# Patient Record
Sex: FEMALE | Race: BLACK OR AFRICAN AMERICAN | NOT HISPANIC OR LATINO | Employment: FULL TIME
[De-identification: names, ages, dates, MRNs, and addresses within clinical notes are randomized per-mention and may not be internally consistent; named-entity substitution may affect disease eponyms.]

---

## 2017-01-03 ENCOUNTER — SURGERY (OUTPATIENT)
Age: 42
End: 2017-01-03

## 2017-01-05 ENCOUNTER — TELEPHONE (OUTPATIENT)
Dept: GASTROENTEROLOGY | Facility: CLINIC | Age: 42
End: 2017-01-05

## 2017-01-05 NOTE — TELEPHONE ENCOUNTER
Manometry Results:    Jackhammer esophagus.     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    Pls arrange follow up apt with Dr. Bravo on 1/19 at 9am to discuss further management.

## 2017-01-19 ENCOUNTER — LAB VISIT (OUTPATIENT)
Dept: LAB | Facility: HOSPITAL | Age: 42
End: 2017-01-19
Attending: INTERNAL MEDICINE
Payer: COMMERCIAL

## 2017-01-19 ENCOUNTER — TELEPHONE (OUTPATIENT)
Dept: ENDOSCOPY | Facility: HOSPITAL | Age: 42
End: 2017-01-19

## 2017-01-19 ENCOUNTER — OFFICE VISIT (OUTPATIENT)
Dept: GASTROENTEROLOGY | Facility: CLINIC | Age: 42
End: 2017-01-19
Payer: COMMERCIAL

## 2017-01-19 VITALS
SYSTOLIC BLOOD PRESSURE: 117 MMHG | HEIGHT: 64 IN | HEART RATE: 86 BPM | WEIGHT: 197.56 LBS | DIASTOLIC BLOOD PRESSURE: 74 MMHG | BODY MASS INDEX: 33.73 KG/M2

## 2017-01-19 DIAGNOSIS — D64.9 ANEMIA, UNSPECIFIED TYPE: ICD-10-CM

## 2017-01-19 DIAGNOSIS — R13.10 DYSPHAGIA, UNSPECIFIED TYPE: Primary | ICD-10-CM

## 2017-01-19 DIAGNOSIS — R79.89 ABNORMAL LFTS: ICD-10-CM

## 2017-01-19 DIAGNOSIS — R14.0 BLOATING: ICD-10-CM

## 2017-01-19 DIAGNOSIS — K21.9 GASTROESOPHAGEAL REFLUX DISEASE, ESOPHAGITIS PRESENCE NOT SPECIFIED: ICD-10-CM

## 2017-01-19 LAB
ALBUMIN SERPL BCP-MCNC: 3.7 G/DL
ALP SERPL-CCNC: 81 U/L
ALT SERPL W/O P-5'-P-CCNC: 9 U/L
ANION GAP SERPL CALC-SCNC: 7 MMOL/L
AST SERPL-CCNC: 19 U/L
BASOPHILS # BLD AUTO: 0.01 K/UL
BASOPHILS NFR BLD: 0.2 %
BILIRUB DIRECT SERPL-MCNC: 0.3 MG/DL
BILIRUB SERPL-MCNC: 0.9 MG/DL
BUN SERPL-MCNC: 15 MG/DL
CALCIUM SERPL-MCNC: 9.5 MG/DL
CHLORIDE SERPL-SCNC: 107 MMOL/L
CO2 SERPL-SCNC: 28 MMOL/L
CREAT SERPL-MCNC: 0.9 MG/DL
DIFFERENTIAL METHOD: ABNORMAL
EOSINOPHIL # BLD AUTO: 0.1 K/UL
EOSINOPHIL NFR BLD: 1.7 %
ERYTHROCYTE [DISTWIDTH] IN BLOOD BY AUTOMATED COUNT: 13.5 %
EST. GFR  (AFRICAN AMERICAN): >60 ML/MIN/1.73 M^2
EST. GFR  (NON AFRICAN AMERICAN): >60 ML/MIN/1.73 M^2
FERRITIN SERPL-MCNC: 23 NG/ML
GLUCOSE SERPL-MCNC: 85 MG/DL
HBV SURFACE AG SERPL QL IA: NEGATIVE
HCT VFR BLD AUTO: 36.3 %
HCV AB SERPL QL IA: NEGATIVE
HEPATITIS A ANTIBODY, IGG: POSITIVE
HGB BLD-MCNC: 12.2 G/DL
IRON SERPL-MCNC: 61 UG/DL
LYMPHOCYTES # BLD AUTO: 1.7 K/UL
LYMPHOCYTES NFR BLD: 35.7 %
MCH RBC QN AUTO: 30.9 PG
MCHC RBC AUTO-ENTMCNC: 33.6 %
MCV RBC AUTO: 92 FL
MONOCYTES # BLD AUTO: 0.2 K/UL
MONOCYTES NFR BLD: 3.6 %
NEUTROPHILS # BLD AUTO: 2.8 K/UL
NEUTROPHILS NFR BLD: 58.6 %
PLATELET # BLD AUTO: 236 K/UL
PMV BLD AUTO: 11.3 FL
POTASSIUM SERPL-SCNC: 4.4 MMOL/L
PROT SERPL-MCNC: 7.6 G/DL
RBC # BLD AUTO: 3.95 M/UL
SATURATED IRON: 16 %
SODIUM SERPL-SCNC: 142 MMOL/L
TOTAL IRON BINDING CAPACITY: 392 UG/DL
TRANSFERRIN SERPL-MCNC: 265 MG/DL
WBC # BLD AUTO: 4.73 K/UL

## 2017-01-19 PROCEDURE — 86803 HEPATITIS C AB TEST: CPT

## 2017-01-19 PROCEDURE — 87340 HEPATITIS B SURFACE AG IA: CPT

## 2017-01-19 PROCEDURE — 36415 COLL VENOUS BLD VENIPUNCTURE: CPT

## 2017-01-19 PROCEDURE — 80053 COMPREHEN METABOLIC PANEL: CPT

## 2017-01-19 PROCEDURE — 99214 OFFICE O/P EST MOD 30 MIN: CPT | Mod: S$GLB,,, | Performed by: INTERNAL MEDICINE

## 2017-01-19 PROCEDURE — 82728 ASSAY OF FERRITIN: CPT

## 2017-01-19 PROCEDURE — 83540 ASSAY OF IRON: CPT

## 2017-01-19 PROCEDURE — 86790 VIRUS ANTIBODY NOS: CPT

## 2017-01-19 PROCEDURE — 99999 PR PBB SHADOW E&M-EST. PATIENT-LVL III: CPT | Mod: PBBFAC,,, | Performed by: INTERNAL MEDICINE

## 2017-01-19 PROCEDURE — 85025 COMPLETE CBC W/AUTO DIFF WBC: CPT

## 2017-01-19 PROCEDURE — 82248 BILIRUBIN DIRECT: CPT

## 2017-01-19 PROCEDURE — 86706 HEP B SURFACE ANTIBODY: CPT

## 2017-01-19 RX ORDER — PANTOPRAZOLE SODIUM 40 MG/1
40 TABLET, DELAYED RELEASE ORAL 2 TIMES DAILY
Qty: 180 TABLET | Refills: 3 | Status: SHIPPED | OUTPATIENT
Start: 2017-01-19 | End: 2019-02-04

## 2017-01-19 NOTE — PROGRESS NOTES
[facility] Established Patient Note        Reason for Consult:    Chief Complaint   Patient presents with    Dysphagia    Heartburn    Gastroesophageal Reflux       No flowsheet data found.    PCP:   Primary Doctor No       Referring MD:  No referring provider defined for this encounter.    HPI:  Daniella Cui is a 41 y.o. female previously followed by Dr. Dolan, here for evaluation of the following GI problems:    Dysphagia.  Patient reports difficulty swallowing solids and liquids.  Feels that food moves very slowly down esophagus.  Symptoms occur few times per day.  Also has chest pain and discomfort when swallowing few times per week.  Symptoms started about a year ago. Denies food impactions requiring ED visit.  Reports history of seasonal allergies.  No food allergies.  Denies eczema.    Gas and bloating. Patient reports bothersome gas and bloating with abdominal distension.  Symptoms get worse after meals and as the day progresses.  Consumes milk products and artificial sugars.  Symptoms started about a year or two ago.      GERD.  Has occasional heartburn and regurgitation of acidic liquid.     Early satiety.  Feels full after eating a small meal.  Occasional nausea, no emesis.     Anemia.  Denies gi bleeding. Denies heavy menses. HAs fatigue, feels cold all the time.     Elevated TB. No know liver disease.      Denies dysphagia, abdominal pain, diarrhea, constipation, BRBPR, melena, weight loss.       Previous Studies:   EGD 9/28/16: Normal esophagus. Chronic gastritis. Biopsied -. Normal examined duodenum.  US 10/5/16: No significant sonographic abnormality  Manometry 1/3/17: Hypercontractile (Jackhammer) Esophagus.    ROS:  ROS   Constitutional: No fevers, no chills, no night sweats, no weight loss  ENT: No congestion, no rhinorrhea, no chronic sinus problems  CV: No chest pain, no palpitations  Pulm: No cough, no shortness of  "breath  Ophtho: No blurry vision, no eye redness  GI: see HPI  Derm: No rash  Heme: No lymphadenopathy, no bruising  MSK: No arthritis, no joint swelling, no Raynauds  : No dysuria, no frequent urination, no blood in urine  Endo: No hot or cold intolerance  Neuro: No dizziness, no syncope, no seizure  Psych: No anxiety, no depression    Medical History:   Past Medical History   Diagnosis Date    GERD (gastroesophageal reflux disease)         Surgical History: History reviewed. No pertinent past surgical history.     Family History:   Family History   Problem Relation Age of Onset    Diabetes Father     Hypertension Father     Celiac disease Neg Hx     Colon cancer Neg Hx     Colon polyps Neg Hx     Cystic fibrosis Neg Hx     Esophageal cancer Neg Hx     Hemochromatosis Neg Hx     Inflammatory bowel disease Neg Hx     Liver disease Neg Hx     Rectal cancer Neg Hx     Stomach cancer Neg Hx     Nathan's disease Neg Hx         Social History:   Social History     Social History    Marital status: Significant Other     Spouse name: N/A    Number of children: N/A    Years of education: N/A     Social History Main Topics    Smoking status: Never Smoker    Smokeless tobacco: None    Alcohol use No    Drug use: No    Sexual activity: Not Asked     Other Topics Concern    None     Social History Narrative    None        Review of patient's allergies indicates:  No Known Allergies    Current Outpatient Prescriptions   Medication Sig Dispense Refill    pantoprazole (PROTONIX) 40 MG tablet Take 1 tablet (40 mg total) by mouth 2 (two) times daily. 180 tablet 3     No current facility-administered medications for this visit.         Objective Findings:  Vital Signs:  Visit Vitals    /74    Pulse 86    Ht 5' 4" (1.626 m)    Wt 89.6 kg (197 lb 8.5 oz)    LMP 12/29/2016    BMI 33.91 kg/m2     Body mass index is 33.91 kg/(m^2).    Physical Exam:  General appearance: alert, cooperative, no " distress  HENT: Normocephalic, atraumatic, neck symmetrical, no nasal discharge  Eyes: conjunctivae/corneas clear, PERRL, EOM's intact  Lungs: clear to auscultation bilaterally, no dullness to percussion bilaterally  Heart: regular rate and rhythm without rub; no displacement of the PMI  Abdomen: soft, non-tender; bowel sounds normoactive; no organomegaly  Extremities: extremities symmetric; no clubbing, cyanosis, or edema  Integument: Skin color, texture, turgor normal; no rashes; hair distrubution normal  Neurologic: Alert and oriented X 3, normal strength, normal coordination and gait  Psychiatric: no pressured speech; normal affect; no evidence of impaired cognition    Labs:  Lab Results   Component Value Date    WBC 5.91 09/14/2016    HGB 11.5 (L) 09/14/2016    HCT 33.9 (L) 09/14/2016    MCV 93 09/14/2016     09/14/2016     Lab Results   Component Value Date     09/14/2016    K 4.1 09/14/2016     09/14/2016    CO2 31 (H) 09/14/2016    GLU 86 09/14/2016    BUN 12 09/14/2016    CREATININE 0.9 09/14/2016    CALCIUM 9.5 09/14/2016    PROT 7.2 09/14/2016    ALBUMIN 4.0 09/14/2016    BILITOT 1.2 (H) 09/14/2016    ALKPHOS 70 09/14/2016    AST 17 09/14/2016    ALT 9 (L) 09/14/2016         Assessment and Plan:  Daniella Cui is a 41 y.o. female with:  Dysphagia.  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  -Protonix 40mg BID   -EGD w EoE bx on PPI x 8 weeks  -Will try CCB vs TCA.  Would consider Botox if no improvement     Gas and bloating.   -Eliminate lactose and artificial sugars for 2 weeks    GERD. Early satiety     -PPI BID, will decrease after EGD     Anemia.   -iron studies    Elevated TB. US negative  -Check hep panel, CMP, DB      Return in about 3 months (around 4/19/2017).    1. Dysphagia, unspecified type    2. Bloating    3. Gastroesophageal reflux disease, esophagitis presence not specified    4. Abnormal LFTs    5. Anemia, unspecified type          Order summary:  Orders Placed This  Encounter    CBC auto differential    Comprehensive metabolic panel    Bilirubin, direct    Hepatitis C antibody    Hepatitis B Surface Antibody, Qual/Quant    Hepatitis B surface antigen    Hepatitis A antibody, IgG    Ferritin    Iron and TIBC    pantoprazole (PROTONIX) 40 MG tablet         Thank you so much for allowing me to participate in the care of Daniella Bravo MD

## 2017-01-19 NOTE — MR AVS SNAPSHOT
[facility]  [address]  [place] [address]  Phone: [phone]  Fax: [phone]                  Daniella Cui   2017 9:00 AM   Office Visit    Description:  Female : 1975   Provider:  Rosy Bravo MD   Department:  [facility]           Reason for Visit     Dysphagia     Heartburn     Gastroesophageal Reflux           Diagnoses this Visit        Comments    Dysphagia, unspecified type    -  Primary     Bloating         Gastroesophageal reflux disease, esophagitis presence not specified         Abnormal LFTs         Anemia, unspecified type                To Do List           Future Appointments        Provider Department Dept Phone    2017 10:20 AM LAB, APPOINTMENT [place] [facility] [phone]      Goals (5 Years of Data)     None       These Medications        Disp Refills Start End    pantoprazole (PROTONIX) 40 MG tablet 180 tablet 3 2017    Take 1 tablet (40 mg total) by mouth 2 (two) times daily. - Oral    Pharmacy: [id number] - [place] [address] [facility] [address] Ph #: [phone]         [facility]     [facility] -  Assistance  Registered nurses in the [facility] provide clinical advisement, health education, appointment booking, and other advisory services.  Call for this free service at [phone].             Medications           Message regarding Medications     Verify the changes and/or additions to your medication regime listed below are the same as discussed with your clinician today.  If any of these changes or additions are incorrect, please notify your healthcare provider.        START taking these NEW medications        Refills    pantoprazole (PROTONIX) 40 MG tablet 3    Sig: Take 1 tablet (40 mg total) by mouth 2 (two) times daily.    Class: Normal    Route: Oral          "  Verify that the below list of medications is an accurate representation of the medications you are currently taking.  If none reported, the list may be blank. If incorrect, please contact your healthcare provider. Carry this list with you in case of emergency.           Current Medications     pantoprazole (PROTONIX) 40 MG tablet Take 1 tablet (40 mg total) by mouth 2 (two) times daily.           Clinical Reference Information           Vital Signs - Last Recorded  Most recent update: 1/19/2017  9:19 AM by Darcy Pena MA    BP Pulse Ht Wt LMP BMI    117/74 86 5' 4" (1.626 m) 89.6 kg (197 lb 8.5 oz) 12/29/2016 33.91 kg/m2      Blood Pressure          Most Recent Value    BP  117/74      Allergies as of 1/19/2017     No Known Allergies      Immunizations Administered on Date of Encounter - 1/19/2017     None      Orders Placed During Today's Visit     Future Labs/Procedures Expected by Expires    Bilirubin, direct  1/19/2017 3/20/2018    CBC auto differential  1/19/2017 3/20/2018    Comprehensive metabolic panel  1/19/2017 3/20/2018    Ferritin  1/19/2017 3/20/2018    Hepatitis A antibody, IgG  1/19/2017 3/20/2018    Hepatitis B Surface Antibody, Qual/Quant  1/19/2017 3/20/2018    Hepatitis B surface antigen  1/19/2017 3/20/2018    Hepatitis C antibody  1/19/2017 3/20/2018    Iron and TIBC  1/19/2017 3/20/2018      "

## 2017-01-20 LAB
HEP. B SURF AB, QUAL: POSITIVE
HEP. B SURF AB, QUANT.: NORMAL MIU/ML

## 2017-03-15 ENCOUNTER — PATIENT MESSAGE (OUTPATIENT)
Dept: ENDOSCOPY | Facility: HOSPITAL | Age: 42
End: 2017-03-15

## 2017-03-16 ENCOUNTER — TELEPHONE (OUTPATIENT)
Dept: ENDOSCOPY | Facility: HOSPITAL | Age: 42
End: 2017-03-16

## 2017-11-14 ENCOUNTER — OFFICE VISIT (OUTPATIENT)
Dept: URGENT CARE | Facility: CLINIC | Age: 42
End: 2017-11-14
Payer: MEDICAID

## 2017-11-14 VITALS
HEART RATE: 74 BPM | HEIGHT: 64 IN | RESPIRATION RATE: 19 BRPM | DIASTOLIC BLOOD PRESSURE: 83 MMHG | OXYGEN SATURATION: 100 % | TEMPERATURE: 99 F | BODY MASS INDEX: 34.15 KG/M2 | WEIGHT: 200 LBS | SYSTOLIC BLOOD PRESSURE: 118 MMHG

## 2017-11-14 DIAGNOSIS — R10.9 ABDOMINAL PAIN, UNSPECIFIED ABDOMINAL LOCATION: Primary | ICD-10-CM

## 2017-11-14 LAB
B-HCG UR QL: NEGATIVE
BILIRUB UR QL STRIP: NEGATIVE
CTP QC/QA: YES
GLUCOSE UR QL STRIP: NEGATIVE
KETONES UR QL STRIP: NEGATIVE
LEUKOCYTE ESTERASE UR QL STRIP: NEGATIVE
PH, POC UA: 6
POC BLOOD, URINE: NEGATIVE
POC NITRATES, URINE: NEGATIVE
PROT UR QL STRIP: NEGATIVE
SP GR UR STRIP: 1.01 (ref 1–1.03)
UROBILINOGEN UR STRIP-ACNC: NORMAL (ref 0.1–1.1)

## 2017-11-14 PROCEDURE — 81025 URINE PREGNANCY TEST: CPT | Mod: S$GLB,,, | Performed by: FAMILY MEDICINE

## 2017-11-14 PROCEDURE — 99213 OFFICE O/P EST LOW 20 MIN: CPT | Mod: 25,S$GLB,, | Performed by: FAMILY MEDICINE

## 2017-11-14 PROCEDURE — 81003 URINALYSIS AUTO W/O SCOPE: CPT | Mod: QW,S$GLB,, | Performed by: FAMILY MEDICINE

## 2017-11-14 NOTE — PATIENT INSTRUCTIONS
Unknown Causes of Abdominal Pain (Female)    The exact cause of your abdominal (stomach) pain is not clear. This does not mean that this is something to worry about. Everyone likes to know the exact cause of the problem, but sometimes with abdominal pain, there is no clear-cut cause, and this could be a good thing. The good news is that your symptoms can be treated, and you will feel better.   Your condition does not seem serious now; however, sometimes the signs of a serious problem may take more time to appear. For this reason, it is important for you to watch for any new symptoms, problems, or worsening of your condition.  Over the next few days, the abdominal pain may come and go, or be continuous. Other common symptoms can include nausea and vomiting. Sometimes it can be difficult to tell if you feel nauseous, you may just feel bad and not associate that feeling with nausea. Constipation, diarrhea, and a fever may go along with the pain.  The pain may continue even if treated correctly over the following days. Depending on how things go, sometimes the cause can become clear and may require further or different treatment. Additional evaluations, medications, or tests may also be needed.  Home care  Your healthcare provider may prescribe medicine for pain, symptoms, or an infection.  Follow the healthcare provider's instructions for taking these medicines.  General care  · Rest as much as you can until your next exam. No strenuous activities.  · Try to find positions that ease discomfort. A small pillow placed on the abdomen may help relieve pain.  · Something warm on your abdomen (such as a heating pad) may help, but be careful not to burn yourself.  Diet  · Do not force yourself to eat, especially if having cramps, vomiting, or diarrhea.  · Water is important so you do not get dehydrated. Soup may also be good. Sports drinks may also help, especially if they are not too acidic. Make sure you don't drink  sugary drinks as this can make things worse. Take liquids in small amounts. Do not guzzle them.  · Caffeine sometimes makes the pain and cramping worse.  · Avoid dairy products if you have vomiting or diarrhea.  · Don't eat large amounts at a time. Wait a few minutes between bites.  · Eat a diet low in fiber (called a low-residue diet). Foods allowed include refined breads, white rice, fruit and vegetable juices without pulp, tender meats. These foods will pass more easily through the intestine.  · Avoid whole-grain foods, whole fruits and vegetables, meats, seeds and nuts, fried or fatty foods, dairy, alcohol and spicy foods until your symptoms go away.  Follow-up care  Follow up with your healthcare provider, or as advised, if your pain does not begin to improve in the next 24 hours.  Call 911  Call 911 if any of these occur:  · Trouble breathing  · Confusion  · Fainting or loss of consciousness  · Rapid heart rate  · Seizure  When to seek medical advice  Call your healthcare provider right away if any of these occur:  · Pain gets worse or moves to the right lower abdomen  · New or worsening vomiting or diarrhea  · Swelling of the abdomen  · Unable to pass stool for more than 3 days  · Fever of 100.4ºF (38ºC) or higher, or as directed by your healthcare provider.  · Blood in vomit or bowel movements (dark red or black color)  · Jaundice (yellow color of eyes and skin)  · Weakness, dizziness  · Chest, arm, back, neck or jaw pain  · Unexpected vaginal bleeding or missed period  · Can't keep down liquids or water and are getting dehydrated  Date Last Reviewed: 12/30/2015  © 8412-9919 [employer]. [address] [place] [address], [place] [address]. All rights reserved. This information is not intended as a substitute for professional medical care. Always follow your healthcare professional's instructions.

## 2017-11-14 NOTE — PROGRESS NOTES
"Subjective:       Patient ID: Daniella Cui is a 42 y.o. female.    Vitals:  height is 5' 4" (1.626 m) and weight is 90.7 kg (200 lb). Her temperature is 99.4 °F (37.4 °C). Her blood pressure is 118/83 and her pulse is 74. Her respiration is 19 and oxygen saturation is 100%.     Chief Complaint: Abdominal Pain    Patient reports she has had this pain difffusely for the past month. No nausea, vomiting or other concerning symptoms. Patient denies discharge or unusual bleeding. No urinary symptoms      Abdominal Pain   This is a new problem. The current episode started 1 to 4 weeks ago. The onset quality is gradual. The problem occurs intermittently. The problem has been gradually worsening. The pain is located in the epigastric region. The pain is at a severity of 8/10. The pain is severe. The quality of the pain is aching and a sensation of fullness. The abdominal pain does not radiate. Associated symptoms include frequency and nausea. Pertinent negatives include no constipation, diarrhea, dysuria, fever, hematochezia, melena or vomiting. The pain is aggravated by eating. The pain is relieved by nothing. She has tried nothing for the symptoms. The treatment provided no relief.     Review of Systems   Constitution: Negative for chills and fever.   Cardiovascular: Negative for chest pain.   Respiratory: Negative for shortness of breath.    Musculoskeletal: Positive for back pain.   Gastrointestinal: Positive for abdominal pain and nausea. Negative for constipation, diarrhea, hematochezia, melena and vomiting.   Genitourinary: Positive for frequency. Negative for dysuria.       Objective:      Physical Exam   Constitutional: She appears well-developed and well-nourished.   HENT:   Head: Normocephalic and atraumatic.   Eyes: EOM are normal. Pupils are equal, round, and reactive to light.   Neck: Normal range of motion. Neck supple.   Cardiovascular: Normal rate, regular rhythm and normal heart sounds.  "   Pulmonary/Chest: Effort normal and breath sounds normal.   Abdominal: She exhibits no distension and no mass. There is tenderness (mild diffuse). There is no guarding.   Nursing note and vitals reviewed.      Assessment:       1. Abdominal pain, unspecified abdominal location        Plan:         Abdominal pain, unspecified abdominal location  -     POCT Urinalysis, Dipstick, Automated, W/O Scope  -     POCT urine pregnancy      Non acute abdomen on exam and historical. Advised to followup with [facility] as planned. To go to the ER for worsening symptoms

## 2017-11-14 NOTE — PROGRESS NOTES
"Subjective:       Patient ID: Daniella Cui is a 42 y.o. female.    Vitals:  height is 5' 4" (1.626 m) and weight is 90.7 kg (200 lb). Her temperature is 99.4 °F (37.4 °C). Her blood pressure is 118/83 and her pulse is 74. Her respiration is 19 and oxygen saturation is 100%.     Chief Complaint: Abdominal Pain    HPI  ROS    Objective:      Physical Exam    Assessment:       No diagnosis found.    Plan:         There are no diagnoses linked to this encounter.  ***    "

## 2017-11-22 ENCOUNTER — TELEPHONE (OUTPATIENT)
Dept: URGENT CARE | Facility: CLINIC | Age: 42
End: 2017-11-22

## 2018-09-26 ENCOUNTER — TELEPHONE (OUTPATIENT)
Dept: GASTROENTEROLOGY | Facility: CLINIC | Age: 43
End: 2018-09-26

## 2018-10-16 ENCOUNTER — TELEPHONE (OUTPATIENT)
Dept: GASTROENTEROLOGY | Facility: CLINIC | Age: 43
End: 2018-10-16

## 2018-10-16 NOTE — TELEPHONE ENCOUNTER
Pt wanted to know if any cancellations or earlier appts are available to see  sooner. I told pt not as of yet but that I would keep her updated if so.

## 2018-10-16 NOTE — TELEPHONE ENCOUNTER
----- Message from Юлия Matias sent at 10/16/2018  9:01 AM CDT -----  Contact: self [phone]  Pt called stating she received a phone call from Ashlee, but I didn't see anything charted.    Contact: self [phone]

## 2019-02-04 ENCOUNTER — OFFICE VISIT (OUTPATIENT)
Dept: GASTROENTEROLOGY | Facility: CLINIC | Age: 44
End: 2019-02-04
Payer: MEDICAID

## 2019-02-04 ENCOUNTER — TELEPHONE (OUTPATIENT)
Dept: SPEECH THERAPY | Facility: HOSPITAL | Age: 44
End: 2019-02-04

## 2019-02-04 ENCOUNTER — TELEPHONE (OUTPATIENT)
Dept: GASTROENTEROLOGY | Facility: CLINIC | Age: 44
End: 2019-02-04

## 2019-02-04 ENCOUNTER — TELEPHONE (OUTPATIENT)
Dept: ENDOSCOPY | Facility: HOSPITAL | Age: 44
End: 2019-02-04

## 2019-02-04 VITALS
SYSTOLIC BLOOD PRESSURE: 121 MMHG | DIASTOLIC BLOOD PRESSURE: 80 MMHG | BODY MASS INDEX: 34.18 KG/M2 | WEIGHT: 200.19 LBS | HEART RATE: 70 BPM | HEIGHT: 64 IN

## 2019-02-04 DIAGNOSIS — R07.89 NON-CARDIAC CHEST PAIN: ICD-10-CM

## 2019-02-04 DIAGNOSIS — K21.9 GASTROESOPHAGEAL REFLUX DISEASE, ESOPHAGITIS PRESENCE NOT SPECIFIED: ICD-10-CM

## 2019-02-04 DIAGNOSIS — R68.81 EARLY SATIETY: ICD-10-CM

## 2019-02-04 DIAGNOSIS — R13.10 DYSPHAGIA, UNSPECIFIED TYPE: Primary | ICD-10-CM

## 2019-02-04 DIAGNOSIS — R14.0 BLOATING: ICD-10-CM

## 2019-02-04 DIAGNOSIS — D64.9 ANEMIA, UNSPECIFIED TYPE: ICD-10-CM

## 2019-02-04 PROCEDURE — 99215 PR OFFICE/OUTPT VISIT, EST, LEVL V, 40-54 MIN: ICD-10-PCS | Mod: S$PBB,,, | Performed by: INTERNAL MEDICINE

## 2019-02-04 PROCEDURE — 99999 PR PBB SHADOW E&M-EST. PATIENT-LVL III: CPT | Mod: PBBFAC,,, | Performed by: INTERNAL MEDICINE

## 2019-02-04 PROCEDURE — 99213 OFFICE O/P EST LOW 20 MIN: CPT | Mod: PBBFAC | Performed by: INTERNAL MEDICINE

## 2019-02-04 PROCEDURE — 99215 OFFICE O/P EST HI 40 MIN: CPT | Mod: S$PBB,,, | Performed by: INTERNAL MEDICINE

## 2019-02-04 PROCEDURE — 99999 PR PBB SHADOW E&M-EST. PATIENT-LVL III: ICD-10-PCS | Mod: PBBFAC,,, | Performed by: INTERNAL MEDICINE

## 2019-02-04 RX ORDER — DILTIAZEM HYDROCHLORIDE 60 MG/1
60 TABLET, FILM COATED ORAL 3 TIMES DAILY PRN
Qty: 90 TABLET | Refills: 3 | Status: SHIPPED | OUTPATIENT
Start: 2019-02-04 | End: 2020-02-04

## 2019-02-04 RX ORDER — CALC/MAG/B COMPLEX/D3/HERB 61
30 TABLET ORAL DAILY
Qty: 60 CAPSULE | Refills: 11 | Status: SHIPPED | OUTPATIENT
Start: 2019-02-04 | End: 2020-02-04

## 2019-02-04 NOTE — PATIENT INSTRUCTIONS
Jackhammer esophagus:  -Avoid triggers: cold or hot foods, caffeine  -eat mostly soft foods and liquids as they trigger fewer symptoms  -Start prevacid 30mg daily- 30 minutes before breakfast   -Start diltiazem MR 60mg three times per day (Side Effects: hypotension, bradycardia, edema)  -Start using peppermint oil as needed for trouble swallowing (peppermint oil (4 drops in half glass of water three times per day, mints, tea).  -Try breathing technique during episode of trouble of swallowing or panic attacks   -Discuss your sleep problems with your primary care provider.  We will consider adding trazodone to you regimen.   -Try yoga and meditation

## 2019-02-04 NOTE — PROGRESS NOTES
[facility] Established Patient Note        Reason for Consult:    Chief Complaint   Patient presents with    Dysphagia    Gas    Bloated    Heartburn    Chest Pain    Anemia       No flowsheet data found.    PCP:   Dr. López ????    Referring MD:  Dr. Dolan    HPI:  Daniella Cui is a 43 y.o. female previously followed by Dr. Dolan, here for evaluation of the following GI problems:    GERD.  None    Dysphagia.  Patient reports difficulty swallowing solids and liquids.  Feels that food moves very slowly down esophagus.  Symptoms occur few times per day.  Also has chest pain and discomfort when swallowing few times per week.  Symptoms started about a year ago. Denies food impactions requiring ED visit.  Reports history of seasonal allergies.  No food allergies.  Denies eczema.    Dysphagia.  Reports difficulty swallowing.  Problems with solids: yes  Problems with liquids:yes  Choking while eating yes  Coughing wile eating: yes  Location: cervical esophagus  Onset of symptoms: few years ago  Frequency:  daily  Better w carbonated drinks    Chest pain. Reports occasional chest pain and spasms of esophagus while eating.   Triggers (cold fluids, caffeine, smoking, ETOH): unsure    Not taking any medication   Eating more liquids    Achalasia Eckardt Score  Dysphagia  (none (0), occasional (1), daily (2), with every meal (3)): 3   Regurgitation (none (0), occasional (1), daily (2), with every meal (3)): 3   Chest pain (none (0), occasional (1), daily (2), several times per day (3)): 1    Weight loss (kg) (0 (0), <5 (1), 5-10 (2), >10 (3)): 0  Total Eckardt Score(the final score is the sum of four components (0-12)):  7/12     Early satiety.  Still feels full after eating a small meal.  No nausea, no emesis.     Gas and bloating. Still w  bothersome gas and bloating with abdominal distension.  Avoids milk, consumes artificial sugars    Anemia.  Denies gi  bleeding. Denies heavy menses. HAs fatigue, feels cold all the time.     Elevated TB. No know liver disease.      Denies nausea, vomiting, abdominal pain, diarrhea, constipation, BRBPR, melena, weight loss.      Anxiety.  Has ho of anxiety and panic feelings.  Followed by PCP.  Working on natural ways of managing her anxiety.      Insomnia.  Having difficulty sleeping.  Feels that is chocking at night. Feels anxious at night.      Previous Studies:   EGD 9/28/16: Normal esophagus. Chronic gastritis. Biopsied -. Normal examined duodenum.  US 10/5/16: No significant sonographic abnormality  Manometry 1/3/17: Hypercontractile (Jackhammer) Esophagus.    ROS:  ROS   Constitutional: No fevers, no chills, no night sweats, no weight loss  ENT: No congestion, no rhinorrhea, no chronic sinus problems  CV: No chest pain, no palpitations  Pulm: No cough, no shortness of breath  Ophtho: No blurry vision, no eye redness  GI: see HPI  Derm: No rash  Heme: No lymphadenopathy, no bruising  MSK: No arthritis, no joint swelling, no Raynauds  : No dysuria, no frequent urination, no blood in urine  Endo: No hot or cold intolerance  Neuro: No dizziness, no syncope, no seizure  Psych: No anxiety, no depression    Medical History:   Past Medical History:   Diagnosis Date    GERD (gastroesophageal reflux disease)         Surgical History:   Past Surgical History:   Procedure Laterality Date    ESOPHAGOGASTRODUODENOSCOPY (EGD) N/A 9/28/2016    Performed by [facility] at [facility]    MANOMETRY-ESOPHAGEAL-WITH IMPEDANCE N/A 1/3/2017    Performed by Rosy Bravo MD at [facility] ENDO (4TH FLR)        Family History:   Family History   Problem Relation Age of Onset    Diabetes Father     Hypertension Father     Celiac disease Neg Hx     Colon cancer Neg Hx     Colon polyps Neg Hx     Cystic fibrosis Neg Hx     Esophageal cancer Neg Hx     Hemochromatosis Neg Hx     Inflammatory bowel disease Neg Hx     Liver disease Neg Hx   "   Rectal cancer Neg Hx     Stomach cancer Neg Hx     Nathan's disease Neg Hx         Social History:   Social History     Socioeconomic History    Marital status: Significant Other     Spouse name: None    Number of children: None    Years of education: None    Highest education level: None   Social Needs    Financial resource strain: None    Food insecurity - worry: None    Food insecurity - inability: None    Transportation needs - medical: None    Transportation needs - non-medical: None   Occupational History    None   Tobacco Use    Smoking status: Never Smoker   Substance and Sexual Activity    Alcohol use: No    Drug use: No    Sexual activity: None   Other Topics Concern    None   Social History Narrative    None        Review of patient's allergies indicates:  No Known Allergies    Current Outpatient Medications   Medication Sig Dispense Refill    diltiaZEM (CARDIZEM) 60 MG tablet Take 1 tablet (60 mg total) by mouth 3 (three) times daily as needed. 90 tablet 3    lansoprazole (PREVACID 24HR) 15 MG capsule Take 2 capsules (30 mg total) by mouth once daily. 60 capsule 11     No current facility-administered medications for this visit.         Objective Findings:  Vital Signs:  /80   Pulse 70   Ht 5' 4" (1.626 m)   Wt 90.8 kg (200 lb 2.8 oz)   BMI 34.36 kg/m²   Body mass index is 34.36 kg/m².    Physical Exam:  General appearance: alert, cooperative, no distress  HENT: Normocephalic, atraumatic, neck symmetrical, no nasal discharge  Eyes: conjunctivae/corneas clear, PERRL, EOM's intact  Lungs: clear to auscultation bilaterally, no dullness to percussion bilaterally  Heart: regular rate and rhythm without rub; no displacement of the PMI  Abdomen: soft, non-tender; bowel sounds normoactive; no organomegaly  Extremities: extremities symmetric; no clubbing, cyanosis, or edema  Integument: Skin color, texture, turgor normal; no rashes; hair distrubution normal  Neurologic: Alert and " oriented X 3, normal strength, normal coordination and gait  Psychiatric: no pressured speech; normal affect; no evidence of impaired cognition    Labs:  Lab Results   Component Value Date    WBC 4.73 01/19/2017    HGB 12.2 01/19/2017    HCT 36.3 (L) 01/19/2017    MCV 92 01/19/2017     01/19/2017     Lab Results   Component Value Date     01/19/2017    K 4.4 01/19/2017     01/19/2017    CO2 28 01/19/2017    GLU 85 01/19/2017    BUN 15 01/19/2017    CREATININE 0.9 01/19/2017    CALCIUM 9.5 01/19/2017    PROT 7.6 01/19/2017    ALBUMIN 3.7 01/19/2017    BILITOT 0.9 01/19/2017    ALKPHOS 81 01/19/2017    AST 19 01/19/2017    ALT 9 (L) 01/19/2017         Assessment and Plan:  Daniella Cui is a 43 y.o. female with:    GERD. Minimal   -Prevacid 30mg ER daily mostly for dysphagia     Dysphagia. Chocking and coughing while eating. Eckardt Score 7/12.  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  -EGD w EoE, G, D   -MBS and esophagogram  -Repeat manometry if EGD/esophagogram suggest achalasia.   -Avoid triggers: cold fluids, caffeine. Pt denies smoking, ETOH  -Soft foods and liquids as they trigger fewer symptoms   -Prevacid 30mg ER daily   -Diltiazem MR 60mg TID prn (B - 1 RCT)  -try peppermint in various forms prn   -Will consider trazodone at next visit  -Discussed pathophysiology, presentation, and treatment (including surgical options and POEM) of hypercontractile esophagus. Pt want to start with minimally invasive options at this time    -Will consider isosorbide dinitrate, sildenafil, EGD w Botox prior to surgery or POEM     Early satiety.  No nausea or emesis.      Gas and bloating.   -Eliminate lactose and artificial sugars for 2 weeks    Anemia. Low ferritin   -Check iron studies  -EGD     Elevated TB. US negative.  No hepatitis. Repeal CMP/Db nl.   -Check CMP    Anxiety. Panic attacks  -Discussed the benefit of diaphragmatic breathing in management of functional Gi disorders. Provided  handout.   -Discussed the role of mindfulness and meditation in management of functional GI disorders.  Provided handout  -Discussed the role of exercise in management of functional GI disorders.  PT will consider starting exercise program, yoga.      Insomnia.   -Fu w PCP   -Will add trazodone if no improvement w CCB     Follow-up in about 2 months (around 4/4/2019) for BEE Vargas NP.    1. Dysphagia, unspecified type    2. Anemia, unspecified type    3. Non-cardiac chest pain    4. Gastroesophageal reflux disease, esophagitis presence not specified    5. Early satiety    6. Bloating          Order summary:  Orders Placed This Encounter    Fl Modified Barium Swallow Speech    FL Esophagram Complete    CBC auto differential    Comprehensive metabolic panel    TSH    T4, free    Iron and TIBC    Ferritin    SLP video swallow    diltiaZEM (CARDIZEM) 60 MG tablet    lansoprazole (PREVACID 24HR) 15 MG capsule    Case request GI: ESOPHAGOGASTRODUODENOSCOPY (EGD)         Thank you so much for allowing me to participate in the care of Daniella Hammkaty Bravo MD

## 2019-02-05 ENCOUNTER — TELEPHONE (OUTPATIENT)
Dept: GASTROENTEROLOGY | Facility: CLINIC | Age: 44
End: 2019-02-05

## 2019-02-05 ENCOUNTER — PATIENT MESSAGE (OUTPATIENT)
Dept: GASTROENTEROLOGY | Facility: CLINIC | Age: 44
End: 2019-02-05

## 2019-02-05 NOTE — TELEPHONE ENCOUNTER
Called pt and left vm informing her that Dr. Bravo has ordered her some labs to be drawn and she can call back for scheduling.

## 2019-02-05 NOTE — TELEPHONE ENCOUNTER
----- Message from Rosy Bravo MD sent at 2/4/2019  4:14 PM CST -----  pls ask pt to gt labs done.  I added them to her note today

## 2019-02-06 ENCOUNTER — TELEPHONE (OUTPATIENT)
Dept: SPEECH THERAPY | Facility: HOSPITAL | Age: 44
End: 2019-02-06

## 2019-02-15 ENCOUNTER — TELEPHONE (OUTPATIENT)
Dept: SPEECH THERAPY | Facility: HOSPITAL | Age: 44
End: 2019-02-15

## 2019-02-19 ENCOUNTER — TELEPHONE (OUTPATIENT)
Dept: SPEECH THERAPY | Facility: HOSPITAL | Age: 44
End: 2019-02-19

## 2019-04-03 ENCOUNTER — TELEPHONE (OUTPATIENT)
Dept: RADIOLOGY | Facility: HOSPITAL | Age: 44
End: 2019-04-03

## 2019-04-04 ENCOUNTER — HOSPITAL ENCOUNTER (OUTPATIENT)
Dept: RADIOLOGY | Facility: HOSPITAL | Age: 44
Discharge: HOME | End: 2019-04-04
Attending: INTERNAL MEDICINE
Payer: MEDICAID

## 2019-04-04 ENCOUNTER — CLINICAL SUPPORT (OUTPATIENT)
Dept: SPEECH THERAPY | Facility: HOSPITAL | Age: 44
End: 2019-04-04
Attending: INTERNAL MEDICINE
Payer: MEDICAID

## 2019-04-04 DIAGNOSIS — R13.10 DYSPHAGIA, UNSPECIFIED TYPE: ICD-10-CM

## 2019-04-04 PROCEDURE — 74220 X-RAY XM ESOPHAGUS 1CNTRST: CPT | Mod: TC

## 2019-04-04 PROCEDURE — 74230 X-RAY XM SWLNG FUNCJ C+: CPT | Mod: TC

## 2019-04-04 PROCEDURE — 74230 FL MODIFIED BARIUM SWALLOW SPEECH STUDY: ICD-10-PCS | Mod: 26,,, | Performed by: RADIOLOGY

## 2019-04-04 PROCEDURE — 92611 MOTION FLUOROSCOPY/SWALLOW: CPT | Mod: GN | Performed by: SPEECH-LANGUAGE PATHOLOGIST

## 2019-04-04 PROCEDURE — 74230 X-RAY XM SWLNG FUNCJ C+: CPT | Mod: 26,,, | Performed by: RADIOLOGY

## 2019-04-04 NOTE — PROGRESS NOTES
Referring provider: Dr. Rosy Bravo  Reason for visit:  Modified barium swallow study (CPT 58375)    Report Summary   --Date: 04/04/2019  --Purpose: to evaluate anatomy and physiology of the oropharyngeal swallow, to determine effectiveness of rehabilitation strategies, and to determine diet consistency and intervention recommendations.   --Diet recommendations: regular as tolerated    Subjective / History    Daniella Cui is a 43 y.o. female referred by Dr. Bravo for Modified Barium Swallow Study (83623).  She is currently on a regular diet.  She reports a history of difficulty swallowing.  The problem occurs with food, liquid, and pills.  She has reduced meat intake as a result of this issue.  Reports the issue sometimes happens with water.  Reports pills are difficult to swallow and feel stuck.  Denies sensation of aspiration.  Denies weight loss or pneumonia.     Past Medical History:   Diagnosis Date    GERD (gastroesophageal reflux disease)      Current Outpatient Medications on File Prior to Visit   Medication Sig Dispense Refill    diltiaZEM (CARDIZEM) 60 MG tablet Take 1 tablet (60 mg total) by mouth 3 (three) times daily as needed. 90 tablet 3    lansoprazole (PREVACID 24HR) 15 MG capsule Take 2 capsules (30 mg total) by mouth once daily. 60 capsule 11     No current facility-administered medications on file prior to visit.        Objective   Lateral videofluorographic views were obtained. The radiologist and speech pathologist were present for the entire procedure.     Consistencies assessed / method of administration  Thin liquid/6 mL  Thin liquid/cup sip  Thin liquid/sequential cup sips  Applesauce/tsp  Cracker  Barium tablet w/ water    Oral phase  - Grossly WNL with adequate/timely lip closure, tongue control during bolus hold, bolus preparation, and bolus transport/lingual motion.  Little to no oral residue.     Pharyngeal phase  - Timely initiation; intermittently delayed to valleculae  with thin liquids  - Adequate soft palate elevation  - Adequate laryngeal elevation and anterior hyoid excursion  - Adequate tongue base retraction  - Adequate epiglottic movement  - Complete laryngeal vestibular closure  - Adequate pharyngeal stripping wave  - Minimal pharyngeal residue   - Unobstructed PE segment opening    Strategies/therapeutic interventions attempted: N/a.     Rosenbek Penetration-Aspiration Scale (Rosenbek et al 1996)  Best Trial: 1. Contrast does not enter airway.   Worst Trial: 1. Contrast does not enter airway.     Assessment / Impressions   The results of this MBSS indicate that patient demonstrates oropharyngeal swallowing function appropriate for a full PO diet w/o significant restriction.  No aspiration observed.     Recommendations / POC   -Diet: recommend regular as tolerated   -Therapeutic technique: recommend small bites/sips  -Contact clinician or referring provider for repeat MBSS if swallowing status changes or worsens

## 2019-04-05 ENCOUNTER — TELEPHONE (OUTPATIENT)
Dept: GASTROENTEROLOGY | Facility: CLINIC | Age: 44
End: 2019-04-05

## 2019-04-05 ENCOUNTER — TELEPHONE (OUTPATIENT)
Dept: ENDOSCOPY | Facility: HOSPITAL | Age: 44
End: 2019-04-05

## 2019-04-05 NOTE — TELEPHONE ENCOUNTER
----- Message from Lara Dunbar sent at 4/4/2019  4:36 PM CDT -----  Contact: pt#[phone]  Needs Advice    Reason for call:pt is calling about pre for EGd on tomorrow         Communication Preference:call    Additional Information:

## 2019-04-09 ENCOUNTER — TELEPHONE (OUTPATIENT)
Dept: GASTROENTEROLOGY | Facility: CLINIC | Age: 44
End: 2019-04-09

## 2019-04-09 NOTE — TELEPHONE ENCOUNTER
Spoke w pt reg esophagram results. Pt verbalized understanding.     Scheduled fu w TT,NP as noted on last office visit. Pt decided on beginning on May. Labs scheduled same day.

## 2019-10-28 ENCOUNTER — TELEPHONE (OUTPATIENT)
Dept: GASTROENTEROLOGY | Facility: CLINIC | Age: 44
End: 2019-10-28

## 2019-10-28 DIAGNOSIS — R13.10 DYSPHAGIA, UNSPECIFIED TYPE: Primary | ICD-10-CM

## 2019-10-29 ENCOUNTER — TELEPHONE (OUTPATIENT)
Dept: ENDOSCOPY | Facility: HOSPITAL | Age: 44
End: 2019-10-29

## 2019-10-29 NOTE — TELEPHONE ENCOUNTER
[facility] PROCEDURE ORDERS    CLEARANCE FOR PROCEDURES:  No needed     PROCEDURES  EGD with Endo[facility]     FLOOR:  4th Floor     PREP  Full liquid diet 3 days

## 2019-10-29 NOTE — TELEPHONE ENCOUNTER
----- Message from Yari Barnhart RN sent at 10/28/2019 11:01 AM CDT -----  Contact:  pt [phone]      ----- Message -----  From: Maribel Espinal RN  Sent: 10/28/2019  10:51 AM CDT  To: Yari Barnhart RN    Hey,    Looks like patient was originally seen by Dr. Bravo in late 2017 and never followed through with getting her ordered EGD completed. The order was re-ordered for her in 2018 and she failed to prep for procedure and it was cancelled.  She will need a new order from Dr. Bravo or Jory and type of prep they want her to follow if we are to get her scheduled again.    Thanks,  NARCISA López  ----- Message -----  From: Yari Barnhart RN  Sent: 10/28/2019  10:46 AM CDT  To: [facility] Endo Schedulers    Looks like she was schedule for 4/2019 but was cancelled. Thank you. Yari  ----- Message -----  From: [address]  Sent: 10/28/2019   9:50 AM CDT  To: Shelly HENRIQUEZ Staff    Pt would like to schedule for a Esophagoga.  Pt was schedule for 04/0819 but was cancelled. Please call pt.

## 2020-01-27 ENCOUNTER — ANESTHESIA EVENT (OUTPATIENT)
Dept: ENDOSCOPY | Facility: HOSPITAL | Age: 45
End: 2020-01-27
Payer: MEDICAID

## 2020-01-27 ENCOUNTER — HOSPITAL ENCOUNTER (OUTPATIENT)
Facility: HOSPITAL | Age: 45
Discharge: HOME | End: 2020-01-27
Attending: INTERNAL MEDICINE | Admitting: INTERNAL MEDICINE
Payer: MEDICAID

## 2020-01-27 ENCOUNTER — ANESTHESIA (OUTPATIENT)
Dept: ENDOSCOPY | Facility: HOSPITAL | Age: 45
End: 2020-01-27
Payer: MEDICAID

## 2020-01-27 VITALS
BODY MASS INDEX: 37.56 KG/M2 | OXYGEN SATURATION: 99 % | HEIGHT: 64 IN | HEART RATE: 84 BPM | DIASTOLIC BLOOD PRESSURE: 67 MMHG | SYSTOLIC BLOOD PRESSURE: 114 MMHG | WEIGHT: 220 LBS | TEMPERATURE: 98 F | RESPIRATION RATE: 18 BRPM

## 2020-01-27 DIAGNOSIS — R13.10 DYSPHAGIA: ICD-10-CM

## 2020-01-27 DIAGNOSIS — R13.10 DYSPHAGIA, UNSPECIFIED TYPE: Primary | ICD-10-CM

## 2020-01-27 LAB
B-HCG UR QL: NEGATIVE
CTP QC/QA: YES

## 2020-01-27 PROCEDURE — 81025 URINE PREGNANCY TEST: CPT | Performed by: INTERNAL MEDICINE

## 2020-01-27 PROCEDURE — 00731 ANES UPR GI NDSC PX NOS: CPT | Performed by: INTERNAL MEDICINE

## 2020-01-27 PROCEDURE — 37000009 HC ANESTHESIA EA ADD 15 MINS: Performed by: INTERNAL MEDICINE

## 2020-01-27 PROCEDURE — 43239 PR EGD, FLEX, W/BIOPSY, SGL/MULTI: ICD-10-PCS | Mod: ,,, | Performed by: INTERNAL MEDICINE

## 2020-01-27 PROCEDURE — E9220 PRA ENDO ANESTHESIA: ICD-10-PCS | Mod: ,,, | Performed by: NURSE ANESTHETIST, CERTIFIED REGISTERED

## 2020-01-27 PROCEDURE — 63600175 PHARM REV CODE 636 W HCPCS: Performed by: NURSE ANESTHETIST, CERTIFIED REGISTERED

## 2020-01-27 PROCEDURE — 27201012 HC FORCEPS, HOT/COLD, DISP: Performed by: INTERNAL MEDICINE

## 2020-01-27 PROCEDURE — 63600175 PHARM REV CODE 636 W HCPCS: Performed by: INTERNAL MEDICINE

## 2020-01-27 PROCEDURE — 37000008 HC ANESTHESIA 1ST 15 MINUTES: Performed by: INTERNAL MEDICINE

## 2020-01-27 PROCEDURE — 88305 TISSUE EXAM BY PATHOLOGIST: CPT | Performed by: PATHOLOGY

## 2020-01-27 PROCEDURE — 88305 TISSUE EXAM BY PATHOLOGIST: ICD-10-PCS | Mod: 26,,, | Performed by: PATHOLOGY

## 2020-01-27 PROCEDURE — 88305 TISSUE EXAM BY PATHOLOGIST: CPT | Mod: 26,,, | Performed by: PATHOLOGY

## 2020-01-27 PROCEDURE — 91040 PR ESOPH BALLOON DISTENSION TST: ICD-10-PCS | Mod: 26,,, | Performed by: INTERNAL MEDICINE

## 2020-01-27 PROCEDURE — 91040 ESOPH BALLOON DISTENSION TST: CPT | Mod: 26,,, | Performed by: INTERNAL MEDICINE

## 2020-01-27 PROCEDURE — 91040 ESOPH BALLOON DISTENSION TST: CPT | Performed by: INTERNAL MEDICINE

## 2020-01-27 PROCEDURE — E9220 PRA ENDO ANESTHESIA: HCPCS | Mod: ,,, | Performed by: NURSE ANESTHETIST, CERTIFIED REGISTERED

## 2020-01-27 PROCEDURE — 43239 EGD BIOPSY SINGLE/MULTIPLE: CPT | Mod: ,,, | Performed by: INTERNAL MEDICINE

## 2020-01-27 PROCEDURE — 43239 EGD BIOPSY SINGLE/MULTIPLE: CPT | Performed by: INTERNAL MEDICINE

## 2020-01-27 RX ORDER — SODIUM CHLORIDE 9 MG/ML
INJECTION, SOLUTION INTRAVENOUS CONTINUOUS
Status: DISCONTINUED | OUTPATIENT
Start: 2020-01-27 | End: 2020-01-27 | Stop reason: HOSPADM

## 2020-01-27 RX ORDER — PROPOFOL 10 MG/ML
VIAL (ML) INTRAVENOUS
Status: DISCONTINUED | OUTPATIENT
Start: 2020-01-27 | End: 2020-01-27

## 2020-01-27 RX ORDER — SODIUM CHLORIDE 0.9 % (FLUSH) 0.9 %
10 SYRINGE (ML) INJECTION
Status: DISCONTINUED | OUTPATIENT
Start: 2020-01-27 | End: 2020-01-27 | Stop reason: HOSPADM

## 2020-01-27 RX ORDER — PROPOFOL 10 MG/ML
VIAL (ML) INTRAVENOUS CONTINUOUS PRN
Status: DISCONTINUED | OUTPATIENT
Start: 2020-01-27 | End: 2020-01-27

## 2020-01-27 RX ADMIN — PROPOFOL 150 MG: 10 INJECTION, EMULSION INTRAVENOUS at 03:01

## 2020-01-27 RX ADMIN — PROPOFOL 35 MG: 10 INJECTION, EMULSION INTRAVENOUS at 03:01

## 2020-01-27 RX ADMIN — SODIUM CHLORIDE: 0.9 INJECTION, SOLUTION INTRAVENOUS at 03:01

## 2020-01-27 RX ADMIN — PROPOFOL 50 MG: 10 INJECTION, EMULSION INTRAVENOUS at 03:01

## 2020-01-27 RX ADMIN — SODIUM CHLORIDE: 0.9 INJECTION, SOLUTION INTRAVENOUS at 01:01

## 2020-01-27 RX ADMIN — PROPOFOL 200 MCG/KG/MIN: 10 INJECTION, EMULSION INTRAVENOUS at 03:01

## 2020-01-27 RX ADMIN — PROPOFOL 25 MG: 10 INJECTION, EMULSION INTRAVENOUS at 03:01

## 2020-01-27 NOTE — H&P
Short Stay Endoscopy History and Physical    PCP - Primary Doctor No     Procedure - EGD w Endoflip  ASA - per anesthesia  Mallampati - per anesthesia  History of Anesthesia problems - no  Family history Anesthesia problems -  no   Plan of anesthesia - General    HPI:  This is a 44 y.o. female here for evaluation of dysphagia, LASHONDA:     ROS:  Constitutional: No fevers, chills, No weight loss  CV: No chest pain  Pulm: No cough, No shortness of breath  Ophtho: No vision changes  GI: see HPI  Derm: No rash    Medical History:  has a past medical history of GERD (gastroesophageal reflux disease).    Surgical History:  has a past surgical history that includes Esophagogastroduodenoscopy and No past surgeries.    Family History: family history includes Diabetes in her father; Hypertension in her father.. Otherwise no colon cancer, inflammatory bowel disease, or GI malignancies.    Social History:  reports that she has never smoked. She has never used smokeless tobacco. She reports that she does not drink alcohol or use drugs.    Review of patient's allergies indicates:  No Known Allergies    Medications:   Medications Prior to Admission   Medication Sig Dispense Refill Last Dose    diltiaZEM (CARDIZEM) 60 MG tablet Take 1 tablet (60 mg total) by mouth 3 (three) times daily as needed. 90 tablet 3 More than a month at Unknown time    lansoprazole (PREVACID 24HR) 15 MG capsule Take 2 capsules (30 mg total) by mouth once daily. 60 capsule 11 More than a month at Unknown time       Physical Exam:    Vital Signs:   Vitals:    01/27/20 1339   BP: 138/66   Pulse: 91   Resp: 20   Temp: 98.2 °F (36.8 °C)       General Appearance: Well appearing in no acute distress  Eyes:    No scleral icterus  Lungs: CTA anteriorly  Heart:  Regular rate, S1, S2 normal, no murmurs heard.  Abdomen: Soft, non tender, non distended with normal bowel sounds. No hepatosplenomegaly, ascites, or mass.  Extremities: No edema  Skin: No rash    Labs:  Lab  Results   Component Value Date    WBC 4.73 01/19/2017    HGB 12.2 01/19/2017    HCT 36.3 (L) 01/19/2017     01/19/2017    ALT 9 (L) 01/19/2017    AST 19 01/19/2017     01/19/2017    K 4.4 01/19/2017     01/19/2017    CREATININE 0.9 01/19/2017    BUN 15 01/19/2017    CO2 28 01/19/2017       I have explained the risks and benefits of endoscopy procedures to the patient including but not limited to bleeding, perforation, infection, and death.      Rosy Bravo MD

## 2020-01-27 NOTE — [employer] PATIENT INSTRUCTIONS
Discharge Summary/Instructions after an Endoscopic Procedure  Patient Name: Daniella Cui  Patient MRN: [id number]  Patient YOB: 1975 Monday, January 27, 2020  Rosy Bravo MD  RESTRICTIONS:  During your procedure today, you received medications for sedation.  These   medications may affect your judgment, balance and coordination.  Therefore,   for 24 hours, you have the following restrictions:   - DO NOT drive a car, operate machinery, make legal/financial decisions,   sign important papers or drink alcohol.    ACTIVITY:  Today: no heavy lifting, straining or running due to procedural   sedation/anesthesia.  The following day: return to full activity including work.  DIET:  Eat and drink normally unless instructed otherwise.     TREATMENT FOR COMMON SIDE EFFECTS:  - Mild abdominal pain, nausea, belching, bloating or excessive gas:  rest,   eat lightly and use a heating pad.  - Sore Throat: treat with throat lozenges and/or gargle with warm salt   water.  - Because air was used during the procedure, expelling large amounts of air   from your rectum or belching is normal.  - If a bowel prep was taken, you may not have a bowel movement for 1-3 days.    This is normal.  SYMPTOMS TO WATCH FOR AND REPORT TO YOUR PHYSICIAN:  1. Abdominal pain or bloating, other than gas cramps.  2. Chest pain.  3. Back pain.  4. Signs of infection such as: chills or fever occurring within 24 hours   after the procedure.  5. Rectal bleeding, which would show as bright red, maroon, or black stools.   (A tablespoon of blood from the rectum is not serious, especially if   hemorrhoids are present.)  6. Vomiting.  7. Weakness or dizziness.  GO DIRECTLY TO THE NEAREST EMERGENCY ROOM IF YOU HAVE ANY OF THE FOLLOWING:      Difficulty breathing              Chills and/or fever over 101 F   Persistent vomiting and/or vomiting blood   Severe abdominal pain   Severe chest pain   Black, tarry stools   Bleeding- more than one  tablespoon   Any other symptom or condition that you feel may need urgent attention  Your doctor recommends these additional instructions:  If any biopsies were taken, your doctors clinic will contact you in 1 to 2   weeks with any results.  - Discharge patient to home (with escort).   - Resume previous diet.   - Continue present medications.   - Await pathology results.   - The findings and recommendations were discussed with the patient.   - Patient has a contact number available for emergencies.  The signs and   symptoms of potential delayed complications were discussed with the   patient.  Return to normal activities tomorrow.  Written discharge   instructions were provided to the patient.  For questions, problems or results please call your physician - Rosy Bravo MD at Work:  [phone].  [facility], EMERGENCY ROOM PHONE NUMBER: [phone]  IF A COMPLICATION OR EMERGENCY SITUATION ARISES AND YOU ARE UNABLE TO REACH   YOUR PHYSICIAN - GO DIRECTLY TO THE EMERGENCY ROOM.  Rosy Bravo MD  1/27/2020 3:57:55 PM  This report has been verified and signed electronically.  [employer]

## 2020-01-27 NOTE — TRANSFER OF CARE
"Anesthesia Transfer of Care Note    Patient: Daniella Cui    Procedure(s) Performed: Procedure(s) (LRB):  ESOPHAGOGASTRODUODENOSCOPY (EGD) (N/A)    Patient location: GI    Anesthesia Type: general    Transport from OR: Transported from OR on 2-3 L/min O2 by NC with adequate spontaneous ventilation    Post pain: adequate analgesia    Post assessment: no apparent anesthetic complications and tolerated procedure well    Post vital signs: stable    Level of consciousness: responds to stimulation and sedated    Nausea/Vomiting: no nausea/vomiting    Complications: none    Transfer of care protocol was followed      Last vitals:   Visit Vitals  /66 (BP Location: Left arm, Patient Position: Lying)   Pulse 91   Temp 36.8 °C (98.2 °F) (Temporal)   Resp 20   Ht 5' 4" (1.626 m)   Wt 99.8 kg (220 lb)   LMP  (LMP Unknown)   SpO2 98%   Breastfeeding? No   BMI 37.76 kg/m²     "

## 2020-01-27 NOTE — PLAN OF CARE
Pt verbalized understanding of discharge instructions including diet, s/s to notify md, and follow up. Pt refused wheel chair and will ambulate off unit with significant other

## 2020-01-27 NOTE — ANESTHESIA PREPROCEDURE EVALUATION
01/27/2020  Pre-operative evaluation for Procedure(s) (LRB):  ESOPHAGOGASTRODUODENOSCOPY (EGD) (N/A)    Daniella Cui is a 44 y.o. female     Patient Active Problem List   Diagnosis    Latent tuberculosis    GERD (gastroesophageal reflux disease)    Dysphagia       Review of patient's allergies indicates:  No Known Allergies    No current facility-administered medications on file prior to encounter.      Current Outpatient Medications on File Prior to Encounter   Medication Sig Dispense Refill    diltiaZEM (CARDIZEM) 60 MG tablet Take 1 tablet (60 mg total) by mouth 3 (three) times daily as needed. 90 tablet 3    lansoprazole (PREVACID 24HR) 15 MG capsule Take 2 capsules (30 mg total) by mouth once daily. 60 capsule 11       Past Surgical History:   Procedure Laterality Date    ESOPHAGOGASTRODUODENOSCOPY      NO PAST SURGERIES         Social History     Socioeconomic History    Marital status: Significant Other     Spouse name: Not on file    Number of children: Not on file    Years of education: Not on file    Highest education level: Not on file   Occupational History    Not on file   Social Needs    Financial resource strain: Not on file    Food insecurity:     Worry: Not on file     Inability: Not on file    Transportation needs:     Medical: Not on file     Non-medical: Not on file   Tobacco Use    Smoking status: Never Smoker    Smokeless tobacco: Never Used   Substance and Sexual Activity    Alcohol use: No    Drug use: No    Sexual activity: Not on file   Lifestyle    Physical activity:     Days per week: Not on file     Minutes per session: Not on file    Stress: Not on file   Relationships    Social connections:     Talks on phone: Not on file     Gets together: Not on file     Attends Christianity service: Not on file     Active member of club or organization: Not on file      Attends meetings of clubs or organizations: Not on file     Relationship status: Not on file   Other Topics Concern    Not on file   Social History Narrative    Not on file           Anesthesia Evaluation    I have reviewed the Patient Summary Reports.    I have reviewed the Nursing Notes.   I have reviewed the Medications.     Review of Systems  Anesthesia Hx:  No problems with previous Anesthesia    Cardiovascular:  Cardiovascular Normal     Pulmonary:  Pulmonary Normal    Renal/:  Renal/ Normal     Hepatic/GI:   GERD dysphagia   Endocrine:  Endocrine Normal        Physical Exam  General:  Obesity    Airway/Jaw/Neck:  Airway Findings: Mouth Opening: Normal Tongue: Normal  General Airway Assessment: Adult  Mallampati: II  TM Distance: Normal, at least 6 cm       Chest/Lungs:  Chest/Lungs Findings: Clear to auscultation, Normal Respiratory Rate     Heart/Vascular:  Heart Findings: Rate: Normal  Rhythm: Regular Rhythm             Anesthesia Plan  Type of Anesthesia, risks & benefits discussed:  Anesthesia Type:  general, MAC  Patient's Preference:   Intra-op Monitoring Plan: standard ASA monitors  Intra-op Monitoring Plan Comments:   Post Op Pain Control Plan: multimodal analgesia and IV/PO Opioids PRN  Post Op Pain Control Plan Comments:   Induction:   IV  Beta Blocker:         Informed Consent: Patient understands risks and agrees with Anesthesia plan.  Questions answered. Anesthesia consent signed with patient.  ASA Score: 2     Day of Surgery Review of History & Physical:            Ready For Surgery From Anesthesia Perspective.

## 2020-01-27 NOTE — ANESTHESIA POSTPROCEDURE EVALUATION
Anesthesia Post Evaluation    Patient: Daneilla Cui    Procedure(s) Performed: Procedure(s) (LRB):  ESOPHAGOGASTRODUODENOSCOPY (EGD) (N/A)    Final Anesthesia Type: general    Patient location during evaluation: PACU  Patient participation: Yes- Able to Participate  Level of consciousness: awake and alert  Post-procedure vital signs: reviewed and stable  Pain management: adequate  Airway patency: patent    PONV status at discharge: No PONV  Anesthetic complications: no      Cardiovascular status: blood pressure returned to baseline  Respiratory status: unassisted  Hydration status: euvolemic  Follow-up not needed.          Vitals Value Taken Time   /67 1/27/2020  4:18 PM   Temp 36.6 °C (97.9 °F) 1/27/2020  3:56 PM   Pulse 84 1/27/2020  4:18 PM   Resp 18 1/27/2020  4:18 PM   SpO2 99 % 1/27/2020  4:18 PM         Event Time     Out of Recovery 16:27:33          Pain/Chinedu Score: Chinedu Score: 10 (1/27/2020  4:07 PM)

## 2020-02-03 ENCOUNTER — TELEPHONE (OUTPATIENT)
Dept: ENDOSCOPY | Facility: HOSPITAL | Age: 45
End: 2020-02-03

## 2020-02-03 ENCOUNTER — TELEPHONE (OUTPATIENT)
Dept: GASTROENTEROLOGY | Facility: CLINIC | Age: 45
End: 2020-02-03

## 2020-02-03 LAB
FINAL PATHOLOGIC DIAGNOSIS: NORMAL
GROSS: NORMAL

## 2020-02-03 NOTE — TELEPHONE ENCOUNTER
----- Message from Rosy Bravo MD sent at 2/3/2020  1:37 PM CST -----  Please let the pt know that pathology from recent procedure shows:    No  significant abnormality     Endoscopy and colonoscopy is not a perfect exam of the colon. Rarely a significant polyp or colon cancer can be missed. He/she should not ignore symptoms such as blood with bowel movements or abdominal pain and report these symptoms to the doctor if they occur.     Should follow up as previously requested

## 2023-05-04 ENCOUNTER — OCCUPATIONAL HEALTH (OUTPATIENT)
Dept: URGENT CARE | Facility: CLINIC | Age: 48
End: 2023-05-04

## 2023-05-04 DIAGNOSIS — Z11.1 SCREENING-PULMONARY TB: Primary | ICD-10-CM

## 2023-05-04 PROCEDURE — 86580 TB INTRADERMAL TEST: CPT | Mod: S$GLB,,, | Performed by: NURSE PRACTITIONER

## 2023-05-04 PROCEDURE — 86580 POCT TB SKIN TEST: ICD-10-PCS | Mod: S$GLB,,, | Performed by: NURSE PRACTITIONER

## 2024-01-26 DIAGNOSIS — R20.0 NUMBNESS IN BOTH HANDS: Primary | ICD-10-CM

## 2024-01-29 ENCOUNTER — TELEPHONE (OUTPATIENT)
Dept: NEUROLOGY | Facility: CLINIC | Age: 49
End: 2024-01-29
Payer: MEDICAID

## 2024-01-29 NOTE — TELEPHONE ENCOUNTER
----- Message from Sivan Saleem sent at 1/25/2024 11:04 AM CST -----  Hello,    Patient is being referred for numbness in both hands. Referral is scanned in media mgr. Please contact patient to schedule.    Thanks

## 2024-01-29 NOTE — TELEPHONE ENCOUNTER
Spoke to Ms. Cui, scheduled and confirmed EMG Procedure for Monday, February 26, 2024 at 8:30 AM. Appointment letter was printed and mailed to [address] [place] [phone] per patient's request.

## 2024-02-26 ENCOUNTER — PROCEDURE VISIT (OUTPATIENT)
Dept: NEUROLOGY | Facility: CLINIC | Age: 49
End: 2024-02-26
Payer: MEDICAID

## 2024-02-26 DIAGNOSIS — R20.0 NUMBNESS IN BOTH HANDS: ICD-10-CM

## 2024-02-26 PROCEDURE — 95913 NRV CNDJ TEST 13/> STUDIES: CPT | Mod: 26,S$PBB,, | Performed by: PSYCHIATRY & NEUROLOGY

## 2024-02-26 PROCEDURE — 95886 MUSC TEST DONE W/N TEST COMP: CPT | Mod: PBBFAC | Performed by: PSYCHIATRY & NEUROLOGY

## 2024-02-26 PROCEDURE — 95913 NRV CNDJ TEST 13/> STUDIES: CPT | Mod: PBBFAC | Performed by: PSYCHIATRY & NEUROLOGY

## 2024-02-26 PROCEDURE — 95886 MUSC TEST DONE W/N TEST COMP: CPT | Mod: 26,S$PBB,, | Performed by: PSYCHIATRY & NEUROLOGY
